# Patient Record
Sex: MALE | Race: WHITE | URBAN - METROPOLITAN AREA
[De-identification: names, ages, dates, MRNs, and addresses within clinical notes are randomized per-mention and may not be internally consistent; named-entity substitution may affect disease eponyms.]

---

## 2024-02-06 ENCOUNTER — OFFICE VISIT (OUTPATIENT)
Dept: INTERNAL MEDICINE CLINIC | Facility: CLINIC | Age: 39
End: 2024-02-06

## 2024-02-06 ENCOUNTER — HOSPITAL ENCOUNTER (OUTPATIENT)
Dept: GENERAL RADIOLOGY | Facility: HOSPITAL | Age: 39
Discharge: HOME | End: 2024-02-06
Attending: NURSE PRACTITIONER
Payer: COMMERCIAL

## 2024-02-06 VITALS
RESPIRATION RATE: 16 BRPM | HEART RATE: 53 BPM | DIASTOLIC BLOOD PRESSURE: 67 MMHG | HEIGHT: 72 IN | BODY MASS INDEX: 26.14 KG/M2 | WEIGHT: 193 LBS | SYSTOLIC BLOOD PRESSURE: 107 MMHG

## 2024-02-06 DIAGNOSIS — M79.671 RIGHT FOOT PAIN: ICD-10-CM

## 2024-02-06 DIAGNOSIS — M79.671 RIGHT FOOT PAIN: Primary | ICD-10-CM

## 2024-02-06 PROCEDURE — 73620 X-RAY EXAM OF FOOT: CPT | Performed by: NURSE PRACTITIONER

## 2024-02-06 PROCEDURE — 99203 OFFICE O/P NEW LOW 30 MIN: CPT | Performed by: NURSE PRACTITIONER

## 2024-02-06 RX ORDER — MELOXICAM 7.5 MG/1
7.5 TABLET ORAL DAILY
Qty: 30 TABLET | Refills: 0 | Status: SHIPPED | OUTPATIENT
Start: 2024-02-06

## 2024-02-06 NOTE — PROGRESS NOTES
Isidoro Mclean is a 38 year old male.  Chief Complaint   Patient presents with    Pain     Right foot no injury noted     HPI:   He presents with right foot pain. The pain is located on the top of his right foot. He denies any trauma or injury. He denies any swelling or redness. He denies any previous injury. He rates the pain a 10/10. He describes the pain as stretching. He is taking tylenol 1000 mg every 4 - 6 hours for pain with relief.      He does a lot of walking for work.   He has pain with foot flexion and extension.   He has not tried any ice or wrapping the foot.       Current Outpatient Medications   Medication Sig Dispense Refill    Meloxicam 7.5 MG Oral Tab Take 1 tablet (7.5 mg total) by mouth daily. 30 tablet 0      History reviewed. No pertinent past medical history.   History reviewed. No pertinent surgical history.   Social History:  Social History     Socioeconomic History    Marital status: Single      History reviewed. No pertinent family history.   Not on File     REVIEW OF SYSTEMS:     Review of Systems   Constitutional:  Negative for fever.   HENT: Negative.     Respiratory:  Negative for cough, shortness of breath and wheezing.    Cardiovascular:  Negative for chest pain.   Gastrointestinal:  Negative for abdominal pain.   Genitourinary: Negative.    Musculoskeletal:  Positive for arthralgias (right foot).   Skin: Negative.    Neurological: Negative.    Psychiatric/Behavioral: Negative.        Wt Readings from Last 5 Encounters:   02/06/24 193 lb (87.5 kg)     Body mass index is 26.18 kg/m².      EXAM:   /67   Pulse 53   Resp 16   Ht 6' (1.829 m)   Wt 193 lb (87.5 kg)   BMI 26.18 kg/m²     Physical Exam  Vitals reviewed.   Constitutional:       Appearance: Normal appearance.   HENT:      Head: Normocephalic.   Cardiovascular:      Rate and Rhythm: Normal rate and regular rhythm.      Pulses: Normal pulses.   Pulmonary:      Breath sounds: Normal breath sounds. No wheezing.    Musculoskeletal:         General: No swelling.      Right foot: Decreased range of motion. No swelling or tenderness.        Legs:       Comments: Foot pain present on the top of the right foot. No swelling or redness noted. Pain is increased with foot flexion and extension.    Skin:     General: Skin is warm and dry.   Neurological:      Mental Status: He is alert and oriented to person, place, and time.   Psychiatric:         Mood and Affect: Mood normal.         Behavior: Behavior normal.            ASSESSMENT AND PLAN:   1. Right foot pain  - ok to continue tylenol as needed   - may benefit from following up with podiatry based on foot x-ray results   - XR FOOT (2 VIEW), RIGHT (CPT=73620); Future  - Meloxicam 7.5 MG Oral Tab; Take 1 tablet (7.5 mg total) by mouth daily.  Dispense: 30 tablet; Refill: 0      The patient indicates understanding of these issues and agrees to the plan.  Return for if symptoms do not resolve.